# Patient Record
Sex: FEMALE | Race: WHITE | HISPANIC OR LATINO | Employment: OTHER | URBAN - METROPOLITAN AREA
[De-identification: names, ages, dates, MRNs, and addresses within clinical notes are randomized per-mention and may not be internally consistent; named-entity substitution may affect disease eponyms.]

---

## 2017-05-19 ENCOUNTER — IMPORTED ENCOUNTER (OUTPATIENT)
Dept: URBAN - METROPOLITAN AREA CLINIC 31 | Facility: CLINIC | Age: 57
End: 2017-05-19

## 2017-05-19 PROBLEM — H11.153: Noted: 2017-05-19

## 2017-05-19 PROBLEM — H10.32: Noted: 2017-05-19

## 2017-05-19 PROCEDURE — 99213 OFFICE O/P EST LOW 20 MIN: CPT

## 2017-05-19 NOTE — PATIENT DISCUSSION
1.  Non-specific Conjunctivitis OS -- The condition was discussed with patient. Maxitrol 1 gt QID OS X 5D. 3-4 OC if necessary. 2.  Pinguecula OU --Reviewed that growth is caused by the cumulative effect of sun exposure over time and that it does not extend on to the cornea. Recommend UV protection to prevent progression and artificial tears prn for comfort. Return for continued monitoring. 3.   Return for an appointment in 4 months for comprehensive exam.

## 2017-06-01 ENCOUNTER — IMPORTED ENCOUNTER (OUTPATIENT)
Dept: URBAN - METROPOLITAN AREA CLINIC 31 | Facility: CLINIC | Age: 57
End: 2017-06-01

## 2017-06-01 PROBLEM — H10.33: Noted: 2017-06-01

## 2017-06-01 PROCEDURE — 99213 OFFICE O/P EST LOW 20 MIN: CPT

## 2017-06-01 NOTE — PATIENT DISCUSSION
1.  Non-specific Conjunctivitis OU -- The condition was discussed with patient. WC/LS QAM. Start Tdx QID X 1W the BID X 1W. To call if any worsening. 2.  Return for an appointment in 1 week for office call. with Dr. Martín Downing.

## 2017-07-12 ENCOUNTER — IMPORTED ENCOUNTER (OUTPATIENT)
Dept: URBAN - METROPOLITAN AREA CLINIC 31 | Facility: CLINIC | Age: 57
End: 2017-07-12

## 2017-07-12 PROBLEM — H10.403: Noted: 2017-07-12

## 2017-07-12 PROCEDURE — 99213 OFFICE O/P EST LOW 20 MIN: CPT

## 2017-07-12 NOTE — PATIENT DISCUSSION
1.  Allergic Conjunctivitis OU -- The condition was  discussed with the patient. Tobradex did not seem to help much. Start Paseo QD and loratadine QD. 2. Return for an appointment in 3 weeks for office call. with Dr. Katrina Espinoza

## 2017-10-18 ENCOUNTER — IMPORTED ENCOUNTER (OUTPATIENT)
Dept: URBAN - METROPOLITAN AREA CLINIC 31 | Facility: CLINIC | Age: 57
End: 2017-10-18

## 2017-10-18 PROBLEM — H10.403: Noted: 2017-10-18

## 2017-10-18 PROBLEM — H11.153: Noted: 2017-10-18

## 2017-10-18 PROCEDURE — 92014 COMPRE OPH EXAM EST PT 1/>: CPT

## 2017-10-18 PROCEDURE — 99080 SPECIAL REPORTS OR FORMS: CPT

## 2017-10-18 NOTE — PATIENT DISCUSSION
1.  Refractive error - update glasses. 2. Pinguecula OU -- monitor. 3. Allergic Conjunctivitis OU -- Can use Paseo PRN4. Return for an appointment in 1 year for comprehensive exam. with Dr. Shila Macias. 5.  SP successful pterygium excision OU - monitor.

## 2022-04-02 ASSESSMENT — VISUAL ACUITY
OS_CC: 20/60+2
OD_CC: 20/100
OS_CC: 20/40
OD_CC: 20/100

## 2022-04-02 ASSESSMENT — TONOMETRY
OS_IOP_MMHG: 15
OD_IOP_MMHG: 15

## 2022-05-19 ENCOUNTER — EMERGENCY VISIT (OUTPATIENT)
Dept: URBAN - METROPOLITAN AREA CLINIC 34 | Facility: CLINIC | Age: 62
End: 2022-05-19

## 2022-05-19 DIAGNOSIS — Z98.890: ICD-10-CM

## 2022-05-19 DIAGNOSIS — H10.403: ICD-10-CM

## 2022-05-19 PROCEDURE — 92012 INTRM OPH EXAM EST PATIENT: CPT

## 2022-05-19 ASSESSMENT — VISUAL ACUITY
OS_PH: 20/30
OS_SC: 20/50
OD_SC: 20/50
OD_PH: 20/40

## 2022-05-19 NOTE — PATIENT DISCUSSION
1.  Refractive error - update glasses. 2. Pinguecula OU -- monitor. 3. Allergic Conjunctivitis OU -- Can use Paseo PRN4. Return for an appointment in 1 year for comprehensive exam. with Dr. Phill Dye. 5.  SP successful pterygium excision OU - monitor.

## 2022-05-19 NOTE — PATIENT DISCUSSION
1.  Refractive error - update glasses. 2. Pinguecula OU -- monitor. 3. Allergic Conjunctivitis OU -- Can use Paseo PRN4. Return for an appointment in 1 year for comprehensive exam. with Dr. Corin Simms. 5.  SP successful pterygium excision OU - monitor.

## 2024-10-18 ENCOUNTER — COMPREHENSIVE EXAM (OUTPATIENT)
Dept: URBAN - METROPOLITAN AREA CLINIC 34 | Facility: CLINIC | Age: 64
End: 2024-10-18

## 2024-10-18 DIAGNOSIS — H52.4: ICD-10-CM

## 2024-10-18 PROCEDURE — 92015 DETERMINE REFRACTIVE STATE: CPT

## 2024-10-18 PROCEDURE — 92014 COMPRE OPH EXAM EST PT 1/>: CPT
